# Patient Record
Sex: FEMALE | Race: WHITE | NOT HISPANIC OR LATINO | URBAN - METROPOLITAN AREA
[De-identification: names, ages, dates, MRNs, and addresses within clinical notes are randomized per-mention and may not be internally consistent; named-entity substitution may affect disease eponyms.]

---

## 2022-04-27 ENCOUNTER — APPOINTMENT (RX ONLY)
Dept: URBAN - METROPOLITAN AREA CLINIC 41 | Facility: CLINIC | Age: 27
Setting detail: DERMATOLOGY
End: 2022-04-27

## 2022-04-27 DIAGNOSIS — D22 MELANOCYTIC NEVI: ICD-10-CM | Status: STABLE

## 2022-04-27 DIAGNOSIS — L82.1 OTHER SEBORRHEIC KERATOSIS: ICD-10-CM

## 2022-04-27 DIAGNOSIS — D18.0 HEMANGIOMA: ICD-10-CM

## 2022-04-27 DIAGNOSIS — L57.8 OTHER SKIN CHANGES DUE TO CHRONIC EXPOSURE TO NONIONIZING RADIATION: ICD-10-CM | Status: STABLE

## 2022-04-27 DIAGNOSIS — L30.9 DERMATITIS, UNSPECIFIED: ICD-10-CM

## 2022-04-27 DIAGNOSIS — D485 NEOPLASM OF UNCERTAIN BEHAVIOR OF SKIN: ICD-10-CM | Status: INADEQUATELY CONTROLLED

## 2022-04-27 PROBLEM — D18.01 HEMANGIOMA OF SKIN AND SUBCUTANEOUS TISSUE: Status: ACTIVE | Noted: 2022-04-27

## 2022-04-27 PROBLEM — D48.5 NEOPLASM OF UNCERTAIN BEHAVIOR OF SKIN: Status: ACTIVE | Noted: 2022-04-27

## 2022-04-27 PROBLEM — D22.5 MELANOCYTIC NEVI OF TRUNK: Status: ACTIVE | Noted: 2022-04-27

## 2022-04-27 PROCEDURE — ? COUNSELING

## 2022-04-27 PROCEDURE — 11102 TANGNTL BX SKIN SINGLE LES: CPT

## 2022-04-27 PROCEDURE — ? TREATMENT REGIMEN

## 2022-04-27 PROCEDURE — ? BIOPSY BY SHAVE METHOD

## 2022-04-27 PROCEDURE — ? PRESCRIPTION MEDICATION MANAGEMENT

## 2022-04-27 PROCEDURE — ? PRESCRIPTION

## 2022-04-27 PROCEDURE — ? ADDITIONAL NOTES

## 2022-04-27 PROCEDURE — 99203 OFFICE O/P NEW LOW 30 MIN: CPT | Mod: 25

## 2022-04-27 PROCEDURE — ? FULL BODY SKIN EXAM

## 2022-04-27 RX ORDER — DOXYCYCLINE 100 MG/1
CAPSULE ORAL
Qty: 2 | Refills: 0 | Status: ERX | COMMUNITY
Start: 2022-04-27

## 2022-04-27 RX ADMIN — DOXYCYCLINE: 100 CAPSULE ORAL at 00:00

## 2022-04-27 ASSESSMENT — LOCATION DETAILED DESCRIPTION DERM
LOCATION DETAILED: RIGHT RIB CAGE
LOCATION DETAILED: INFERIOR THORACIC SPINE
LOCATION DETAILED: LEFT INFERIOR MEDIAL MIDBACK
LOCATION DETAILED: INFERIOR MID FOREHEAD
LOCATION DETAILED: LEFT POSTERIOR SHOULDER
LOCATION DETAILED: RIGHT POSTERIOR SHOULDER
LOCATION DETAILED: MONS PUBIS

## 2022-04-27 ASSESSMENT — LOCATION SIMPLE DESCRIPTION DERM
LOCATION SIMPLE: UPPER BACK
LOCATION SIMPLE: ABDOMEN
LOCATION SIMPLE: LEFT LOWER BACK
LOCATION SIMPLE: INFERIOR FOREHEAD
LOCATION SIMPLE: GROIN
LOCATION SIMPLE: RIGHT SHOULDER
LOCATION SIMPLE: LEFT SHOULDER

## 2022-04-27 ASSESSMENT — LOCATION ZONE DERM
LOCATION ZONE: ARM
LOCATION ZONE: VULVA
LOCATION ZONE: FACE
LOCATION ZONE: TRUNK

## 2022-04-27 NOTE — PROCEDURE: PRESCRIPTION MEDICATION MANAGEMENT
Render In Strict Bullet Format?: No
Detail Level: Zone
Plan: Pt would like bx to confirm tick fully removed.  Sp noted that we can prescribe doxy prophylactically, which is 200 mg x 1 day within 72 hours of bite.  Sp counseled on warning signs of Lyme dz. Seek medical care if occur. Pt went hiking on Sunday.  Tick was not engorged as seen in photo.  Pt noted tick yesterday.  Pt has appt with PCP tomorrow to discuss further.
Initiate Treatment: Doxycycline 200mg prophylaxis x 1 day. Pt take today

## 2022-04-27 NOTE — HPI: EVALUATION OF SKIN LESION(S)
What Type Of Note Output Would You Prefer (Optional)?: Bullet Format
Hpi Title: Evaluation of Skin Lesions
Additional History: New patient\\nFbse\\nSpot of concern on right abdomen: bug bite from Sunday - pulled it out today\\nItching on groin area\\nNo family hx of skin cancer\\nNot using any rx medications

## 2022-04-27 NOTE — PROCEDURE: COUNSELING
Detail Level: Generalized
Sunscreen Recommendations: spf 30+
Detail Level: Zone
Detail Level: Simple
Detail Level: Detailed
Patient Specific Counseling (Will Not Stick From Patient To Patient): Pt reports itching and burning. Sp offers to exam and tx.  Patient defers today and will return to office after PCP visit if needed
Patient Specific Counseling (Will Not Stick From Patient To Patient): Pt is not pregnant